# Patient Record
Sex: MALE | Race: WHITE | NOT HISPANIC OR LATINO | Employment: FULL TIME | URBAN - METROPOLITAN AREA
[De-identification: names, ages, dates, MRNs, and addresses within clinical notes are randomized per-mention and may not be internally consistent; named-entity substitution may affect disease eponyms.]

---

## 2023-03-31 ENCOUNTER — TELEPHONE (OUTPATIENT)
Dept: PRIMARY CARE | Facility: CLINIC | Age: 43
End: 2023-03-31
Payer: COMMERCIAL

## 2023-03-31 DIAGNOSIS — M10.9 ACUTE GOUT, UNSPECIFIED CAUSE, UNSPECIFIED SITE: Primary | ICD-10-CM

## 2023-03-31 DIAGNOSIS — M10.9 ACUTE GOUT OF FOOT, UNSPECIFIED CAUSE, UNSPECIFIED LATERALITY: Primary | ICD-10-CM

## 2023-03-31 PROBLEM — R42 LIGHTHEADEDNESS: Status: ACTIVE | Noted: 2023-03-31

## 2023-03-31 PROBLEM — M54.9 BACK PAIN: Status: ACTIVE | Noted: 2023-03-31

## 2023-03-31 PROBLEM — S93.401A SPRAIN OF RIGHT ANKLE, INITIAL ENCOUNTER: Status: ACTIVE | Noted: 2023-03-31

## 2023-03-31 PROBLEM — S99.911A RIGHT ANKLE INJURY, INITIAL ENCOUNTER: Status: ACTIVE | Noted: 2023-03-31

## 2023-03-31 PROBLEM — G89.29 CHRONIC PAIN IN TESTICLE: Status: ACTIVE | Noted: 2023-03-31

## 2023-03-31 PROBLEM — M76.61 ACHILLES TENDINITIS OF RIGHT LOWER EXTREMITY: Status: ACTIVE | Noted: 2023-03-31

## 2023-03-31 PROBLEM — J45.909 ASTHMA (HHS-HCC): Status: ACTIVE | Noted: 2023-03-31

## 2023-03-31 PROBLEM — N50.819 CHRONIC PAIN IN TESTICLE: Status: ACTIVE | Noted: 2023-03-31

## 2023-03-31 PROBLEM — M51.26 LUMBAR DISC HERNIATION: Status: ACTIVE | Noted: 2023-03-31

## 2023-03-31 PROBLEM — M26.629 TMJ SYNDROME: Status: ACTIVE | Noted: 2023-03-31

## 2023-03-31 PROBLEM — S33.5XXA LOW BACK SPRAIN, INITIAL ENCOUNTER: Status: ACTIVE | Noted: 2023-03-31

## 2023-03-31 RX ORDER — INDOMETHACIN 50 MG/1
50 CAPSULE ORAL
COMMUNITY
Start: 2017-05-12 | End: 2023-03-31 | Stop reason: SDUPTHER

## 2023-03-31 RX ORDER — GABAPENTIN 600 MG/1
600 TABLET ORAL 3 TIMES DAILY
COMMUNITY
End: 2024-03-04 | Stop reason: SDUPTHER

## 2023-03-31 RX ORDER — BACLOFEN 10 MG/1
10 TABLET ORAL AS NEEDED
COMMUNITY

## 2023-03-31 RX ORDER — INDOMETHACIN 50 MG/1
50 CAPSULE ORAL
Qty: 90 CAPSULE | Refills: 0 | Status: SHIPPED | OUTPATIENT
Start: 2023-03-31 | End: 2023-04-30

## 2023-03-31 RX ORDER — FLUTICASONE PROPIONATE AND SALMETEROL 250; 50 UG/1; UG/1
1 POWDER RESPIRATORY (INHALATION) 2 TIMES DAILY
COMMUNITY
End: 2023-06-13

## 2023-03-31 RX ORDER — INDOMETHACIN 50 MG/1
CAPSULE ORAL
Qty: 30 CAPSULE | Refills: 3 | Status: CANCELLED | OUTPATIENT
Start: 2023-03-31

## 2023-03-31 RX ORDER — ETODOLAC 500 MG/1
500 TABLET, FILM COATED ORAL AS NEEDED
COMMUNITY
Start: 2015-09-29 | End: 2023-06-06

## 2023-03-31 RX ORDER — ALBUTEROL SULFATE 90 UG/1
2 AEROSOL, METERED RESPIRATORY (INHALATION) EVERY 4 HOURS PRN
COMMUNITY
End: 2023-06-14 | Stop reason: SDUPTHER

## 2023-03-31 RX ORDER — NALOXONE HYDROCHLORIDE 4 MG/.1ML
4 SPRAY NASAL AS NEEDED
COMMUNITY
Start: 2022-01-12 | End: 2023-06-06

## 2023-06-02 ENCOUNTER — TELEPHONE (OUTPATIENT)
Dept: PRIMARY CARE | Facility: CLINIC | Age: 43
End: 2023-06-02
Payer: COMMERCIAL

## 2023-06-02 DIAGNOSIS — J45.909 ASTHMA, UNSPECIFIED ASTHMA SEVERITY, UNSPECIFIED WHETHER COMPLICATED, UNSPECIFIED WHETHER PERSISTENT (HHS-HCC): Primary | ICD-10-CM

## 2023-06-06 ENCOUNTER — LAB (OUTPATIENT)
Dept: LAB | Facility: LAB | Age: 43
End: 2023-06-06
Payer: COMMERCIAL

## 2023-06-06 ENCOUNTER — OFFICE VISIT (OUTPATIENT)
Dept: PRIMARY CARE | Facility: CLINIC | Age: 43
End: 2023-06-06
Payer: COMMERCIAL

## 2023-06-06 VITALS
HEIGHT: 75 IN | WEIGHT: 205.4 LBS | BODY MASS INDEX: 25.54 KG/M2 | OXYGEN SATURATION: 97 % | DIASTOLIC BLOOD PRESSURE: 74 MMHG | SYSTOLIC BLOOD PRESSURE: 140 MMHG | HEART RATE: 80 BPM

## 2023-06-06 DIAGNOSIS — R07.81 RIB PAIN ON RIGHT SIDE: Primary | ICD-10-CM

## 2023-06-06 DIAGNOSIS — R73.9 HYPERGLYCEMIA: ICD-10-CM

## 2023-06-06 DIAGNOSIS — Z00.00 ANNUAL PHYSICAL EXAM: ICD-10-CM

## 2023-06-06 DIAGNOSIS — R63.4 ABNORMAL WEIGHT LOSS: ICD-10-CM

## 2023-06-06 LAB
ALANINE AMINOTRANSFERASE (SGPT) (U/L) IN SER/PLAS: 19 U/L (ref 10–52)
ALBUMIN (G/DL) IN SER/PLAS: 4.5 G/DL (ref 3.4–5)
ALKALINE PHOSPHATASE (U/L) IN SER/PLAS: 64 U/L (ref 33–120)
ANION GAP IN SER/PLAS: 11 MMOL/L (ref 10–20)
ASPARTATE AMINOTRANSFERASE (SGOT) (U/L) IN SER/PLAS: 19 U/L (ref 9–39)
BILIRUBIN TOTAL (MG/DL) IN SER/PLAS: 0.5 MG/DL (ref 0–1.2)
CALCIUM (MG/DL) IN SER/PLAS: 9.9 MG/DL (ref 8.6–10.6)
CARBON DIOXIDE, TOTAL (MMOL/L) IN SER/PLAS: 29 MMOL/L (ref 21–32)
CHLORIDE (MMOL/L) IN SER/PLAS: 106 MMOL/L (ref 98–107)
CHOLESTEROL (MG/DL) IN SER/PLAS: 177 MG/DL (ref 0–199)
CHOLESTEROL IN HDL (MG/DL) IN SER/PLAS: 48.3 MG/DL
CHOLESTEROL/HDL RATIO: 3.7
CREATININE (MG/DL) IN SER/PLAS: 0.91 MG/DL (ref 0.5–1.3)
ERYTHROCYTE DISTRIBUTION WIDTH (RATIO) BY AUTOMATED COUNT: 14.3 % (ref 11.5–14.5)
ERYTHROCYTE MEAN CORPUSCULAR HEMOGLOBIN CONCENTRATION (G/DL) BY AUTOMATED: 33 G/DL (ref 32–36)
ERYTHROCYTE MEAN CORPUSCULAR VOLUME (FL) BY AUTOMATED COUNT: 90 FL (ref 80–100)
ERYTHROCYTES (10*6/UL) IN BLOOD BY AUTOMATED COUNT: 5.09 X10E12/L (ref 4.5–5.9)
ESTIMATED AVERAGE GLUCOSE FOR HBA1C: 123 MG/DL
GFR MALE: >90 ML/MIN/1.73M2
GLUCOSE (MG/DL) IN SER/PLAS: 107 MG/DL (ref 74–99)
HEMATOCRIT (%) IN BLOOD BY AUTOMATED COUNT: 45.8 % (ref 41–52)
HEMOGLOBIN (G/DL) IN BLOOD: 15.1 G/DL (ref 13.5–17.5)
HEMOGLOBIN A1C/HEMOGLOBIN TOTAL IN BLOOD: 5.9 %
LDL: 113 MG/DL (ref 0–99)
LEUKOCYTES (10*3/UL) IN BLOOD BY AUTOMATED COUNT: 8.3 X10E9/L (ref 4.4–11.3)
NRBC (PER 100 WBCS) BY AUTOMATED COUNT: 0 /100 WBC (ref 0–0)
PLATELETS (10*3/UL) IN BLOOD AUTOMATED COUNT: 233 X10E9/L (ref 150–450)
POC FINGERSTICK BLOOD GLUCOSE: 110 MG/DL (ref 70–100)
POTASSIUM (MMOL/L) IN SER/PLAS: 4.3 MMOL/L (ref 3.5–5.3)
PROTEIN TOTAL: 6.9 G/DL (ref 6.4–8.2)
SODIUM (MMOL/L) IN SER/PLAS: 142 MMOL/L (ref 136–145)
THYROXINE (T4) FREE (NG/DL) IN SER/PLAS: 1.07 NG/DL (ref 0.78–1.48)
TRIGLYCERIDE (MG/DL) IN SER/PLAS: 78 MG/DL (ref 0–149)
UREA NITROGEN (MG/DL) IN SER/PLAS: 15 MG/DL (ref 6–23)
VLDL: 16 MG/DL (ref 0–40)

## 2023-06-06 PROCEDURE — 85027 COMPLETE CBC AUTOMATED: CPT

## 2023-06-06 PROCEDURE — 80061 LIPID PANEL: CPT

## 2023-06-06 PROCEDURE — 36415 COLL VENOUS BLD VENIPUNCTURE: CPT

## 2023-06-06 PROCEDURE — 82962 GLUCOSE BLOOD TEST: CPT | Performed by: FAMILY MEDICINE

## 2023-06-06 PROCEDURE — 83036 HEMOGLOBIN GLYCOSYLATED A1C: CPT

## 2023-06-06 PROCEDURE — 80053 COMPREHEN METABOLIC PANEL: CPT

## 2023-06-06 PROCEDURE — 84439 ASSAY OF FREE THYROXINE: CPT

## 2023-06-06 PROCEDURE — 99214 OFFICE O/P EST MOD 30 MIN: CPT | Performed by: FAMILY MEDICINE

## 2023-06-06 ASSESSMENT — PAIN SCALES - GENERAL: PAINLEVEL: 4

## 2023-06-06 NOTE — PROGRESS NOTES
"Subjective   Patient ID: Uzair Vivas is a 42 y.o. male who presents for Pain (Right side pain underneath rib cage. ).    HPI End of April felt like stab pains on left axillary line. Prickly feeling. That faded away, then developed a pain on right lateral rib area.  Has lost 60lbs.  No appetite.  No taste or smell.  End of last week patient also felt short of breath.  This has resolved.  Overall he feels much better.  Smoking: less.  Review of Systems  Denies any changes to his bowel movements.  He has no blood in his stool.  Denies any abdominal pain.  Denies excessive thirst or urination  Objective   /74 (BP Location: Left arm, Patient Position: Sitting, BP Cuff Size: Adult)   Pulse 80   Ht 1.905 m (6' 3\")   Wt 93.2 kg (205 lb 6.4 oz)   SpO2 97%   BMI 25.67 kg/m²     Physical Exam  Alert, pleasant and in no acute distress.  Heart: Regular rate and rhythm without murmur  Lungs: Clear to auscultation  Lower extremities: No edema  Abdomen: Soft, nontender without palpable mass.  Rib cage: There is some mild discomfort with rib compression on the right side.  Assessment/Plan   Problem List Items Addressed This Visit    None  Visit Diagnoses       Rib pain on right side    -  Primary    Abnormal weight loss        Relevant Orders    POCT fingerstick glucose manually resulted (Completed)    T4, free    Hyperglycemia        Relevant Orders    Hemoglobin A1C    Annual physical exam        Relevant Orders    POCT fingerstick glucose manually resulted (Completed)    Comprehensive Metabolic Panel    CBC    Lipid Panel    T4, free        Patient here with rib pain.  Things seem to be improving.  We are going to monitor as examination is essentially normal other than some mild discomfort with compression.  He is to follow-up if there is any new shortness of breath, cough or increased pain.  Patient has had a 50+ pound weight loss over the last year or so.  He feels it was due to losing his sense of taste and smell.  " In office blood sugar 110.  We will get an A1c and full labs including T4 to look for any other underlying causes.  We will call patient in the next 2 to 5 days with results.  Encouraged healthy diet.  Discussed importance of quitting smoking.  If weight loss persists, I would like to see patient back in the next 3 months

## 2023-06-13 DIAGNOSIS — J45.909 UNSPECIFIED ASTHMA, UNCOMPLICATED (HHS-HCC): ICD-10-CM

## 2023-06-13 RX ORDER — FLUTICASONE PROPIONATE AND SALMETEROL 250; 50 UG/1; UG/1
POWDER RESPIRATORY (INHALATION)
Qty: 60 EACH | Refills: 5 | Status: SHIPPED | OUTPATIENT
Start: 2023-06-13 | End: 2023-11-21 | Stop reason: SDUPTHER

## 2023-06-13 NOTE — TELEPHONE ENCOUNTER
----- Message from Vasiliy Buckner DO sent at 6/11/2023  7:26 PM EDT -----  Please let patient know that his blood work came back stable.  Cholesterol is good at 177.  Kidney and liver tests look good.  There was no anemia.  The long-range sugar test came back very mildly elevated.  Recommend watching the sweets in the diet.  Finally, recommend follow-up in 3 months so that we can recheck how things are going with the weight.  ----- Message -----  From: Lab, Background User  Sent: 6/6/2023   3:25 PM EDT  To: Vasiliy Buckner DO

## 2023-06-14 RX ORDER — ALBUTEROL SULFATE 90 UG/1
2 AEROSOL, METERED RESPIRATORY (INHALATION) EVERY 4 HOURS PRN
Qty: 18 G | Refills: 3 | Status: SHIPPED | OUTPATIENT
Start: 2023-06-14

## 2023-11-21 ENCOUNTER — TELEPHONE (OUTPATIENT)
Dept: PRIMARY CARE | Facility: CLINIC | Age: 43
End: 2023-11-21
Payer: COMMERCIAL

## 2023-11-21 DIAGNOSIS — J45.909 UNSPECIFIED ASTHMA, UNCOMPLICATED (HHS-HCC): ICD-10-CM

## 2023-11-21 RX ORDER — FLUTICASONE PROPIONATE AND SALMETEROL 250; 50 UG/1; UG/1
1 POWDER RESPIRATORY (INHALATION) 2 TIMES DAILY
Qty: 60 EACH | Refills: 5 | Status: SHIPPED | OUTPATIENT
Start: 2023-11-21 | End: 2023-12-21

## 2023-11-21 NOTE — TELEPHONE ENCOUNTER
Uzair is requesting a refill for his Advair Diskus, its been awhile since he last saw you but he said its hard to get in due to his work schedule. Wants to know if you can still refill it for him? [facility]

## 2024-03-04 ENCOUNTER — OFFICE VISIT (OUTPATIENT)
Dept: PAIN MEDICINE | Facility: CLINIC | Age: 44
End: 2024-03-04
Payer: COMMERCIAL

## 2024-03-04 VITALS
SYSTOLIC BLOOD PRESSURE: 162 MMHG | BODY MASS INDEX: 25.49 KG/M2 | HEART RATE: 88 BPM | TEMPERATURE: 99 F | WEIGHT: 205 LBS | HEIGHT: 75 IN | DIASTOLIC BLOOD PRESSURE: 94 MMHG

## 2024-03-04 DIAGNOSIS — M51.26 LUMBAR DISC HERNIATION: Primary | ICD-10-CM

## 2024-03-04 DIAGNOSIS — S33.5XXA LOW BACK SPRAIN, INITIAL ENCOUNTER: ICD-10-CM

## 2024-03-04 DIAGNOSIS — Z79.891 LONG TERM PRESCRIPTION OPIATE USE: ICD-10-CM

## 2024-03-04 PROCEDURE — 99214 OFFICE O/P EST MOD 30 MIN: CPT | Performed by: ANESTHESIOLOGY

## 2024-03-04 RX ORDER — GABAPENTIN 600 MG/1
600 TABLET ORAL 3 TIMES DAILY
Qty: 270 TABLET | Refills: 3 | Status: SHIPPED | OUTPATIENT
Start: 2024-03-04 | End: 2024-06-02

## 2024-03-04 SDOH — ECONOMIC STABILITY: FOOD INSECURITY: WITHIN THE PAST 12 MONTHS, THE FOOD YOU BOUGHT JUST DIDN'T LAST AND YOU DIDN'T HAVE MONEY TO GET MORE.: NEVER TRUE

## 2024-03-04 SDOH — ECONOMIC STABILITY: FOOD INSECURITY: WITHIN THE PAST 12 MONTHS, YOU WORRIED THAT YOUR FOOD WOULD RUN OUT BEFORE YOU GOT MONEY TO BUY MORE.: NEVER TRUE

## 2024-03-04 ASSESSMENT — PATIENT HEALTH QUESTIONNAIRE - PHQ9
2. FEELING DOWN, DEPRESSED OR HOPELESS: SEVERAL DAYS
1. LITTLE INTEREST OR PLEASURE IN DOING THINGS: SEVERAL DAYS
10. IF YOU CHECKED OFF ANY PROBLEMS, HOW DIFFICULT HAVE THESE PROBLEMS MADE IT FOR YOU TO DO YOUR WORK, TAKE CARE OF THINGS AT HOME, OR GET ALONG WITH OTHER PEOPLE: SOMEWHAT DIFFICULT
SUM OF ALL RESPONSES TO PHQ9 QUESTIONS 1 AND 2: 2

## 2024-03-04 ASSESSMENT — ENCOUNTER SYMPTOMS
DEPRESSION: 0
LOSS OF SENSATION IN FEET: 1
OCCASIONAL FEELINGS OF UNSTEADINESS: 1

## 2024-03-04 ASSESSMENT — LIFESTYLE VARIABLES
HOW OFTEN DO YOU HAVE SIX OR MORE DRINKS ON ONE OCCASION: NEVER
HOW OFTEN DO YOU HAVE A DRINK CONTAINING ALCOHOL: NEVER
AUDIT-C TOTAL SCORE: 0
SKIP TO QUESTIONS 9-10: 1
HOW MANY STANDARD DRINKS CONTAINING ALCOHOL DO YOU HAVE ON A TYPICAL DAY: PATIENT DOES NOT DRINK

## 2024-03-04 ASSESSMENT — COLUMBIA-SUICIDE SEVERITY RATING SCALE - C-SSRS
2. HAVE YOU ACTUALLY HAD ANY THOUGHTS OF KILLING YOURSELF?: NO
6. HAVE YOU EVER DONE ANYTHING, STARTED TO DO ANYTHING, OR PREPARED TO DO ANYTHING TO END YOUR LIFE?: NO
1. IN THE PAST MONTH, HAVE YOU WISHED YOU WERE DEAD OR WISHED YOU COULD GO TO SLEEP AND NOT WAKE UP?: NO

## 2024-03-04 ASSESSMENT — PAIN SCALES - GENERAL: PAINLEVEL: 4

## 2024-03-04 NOTE — PROGRESS NOTES
History Of Present Illness  Uzair Vivas is a 43 y.o. male presenting with   Chief Complaint   Patient presents with    Back Pain     [facility]     Patient follows up after 1 year chronic low back pain that occasionally radiates to his B/L posterior LE with right foot drop. He reports pain is radiating into his tailbone and into his LEFT Testicle which is new. Denies any increased urinary frequency and denies any burning on urination.      Recall: He reports some symptoms of left TMJ have resolved since increasing his gabapentin from 300mg TID to 400mg BID. - As of 10-.      Recall: The pt is s/p lumbar surgery with Dr. Tony Samuel in 2000, and another surgery in September of 2010 at [facility].      The pain is constant, worse with activity and better with rest. The pain is an aching, stabbing and shooting to the B/L LE. Denies LE paresthesias, weakness, saddle anesthesia, bowel or bladder incontinence. To manage this pain the patient has attempted PT with no relief of his pain after 6 weeks of therapy. The patient has undergone lumbar injections and reports no relief of his pain and that he gained weight and developed stretch marks. The patients chronic Asthma on an inhaler.      Pt takes Baclofen for leg cramps, NSAIDS for chronic aching back pain. Pt was taking Percocet 7.5/325mg 35 tabs every month.      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 Pickard     PCP: Dr. Arias          Past Medical History  He has a past medical history of Hordeolum externum unspecified eye, unspecified eyelid (02/10/2020), Personal history of other diseases of the nervous system and sense organs (02/22/2020), Personal history of other diseases of the respiratory system (08/15/2016), Personal history of other diseases of the respiratory system (12/09/2020), and Puncture wound without foreign body, right foot, initial encounter  (11/10/2016).    Surgical History  He has a past surgical history that includes Other surgical history (12/09/2020).     Social History  He reports that he has been smoking cigarettes. He has never used smokeless tobacco. He reports that he does not drink alcohol and does not use drugs.    Family History  No family history on file.     Allergies  Patient has no known allergies.    Review of Systems    All other systems reviewed and negative for any deficits. Pertinent positives and negatives were considered in the medical decision making process.        Physical Exam  BP (!) 162/94   Pulse 88   Temp 37.2 °C (99 °F)   Wt 93 kg (205 lb)     General: Pt appears stated age     Eyes: Conjunctiva non-icteric and lids without obvious rash or drooping. Pupils are symmetric     ENT: Hearing is grossly intact     Neck: No JVD noted, tracheal position midline.      Skin: No rashes or open lesions/ulcers identified on skin.     ---Healed incision with keloid formation on his lumbar spine measuring 5 inches in length.      Musculoskeletal: Gait is grossly normal     Digits/nails show no clubbing or cyanosis     Exam of muscles/joints/bones shows no gross atrophy and no abnormal/involuntary movements in the head/neckNo asymmetry or masses noted in the head/neck     Stability: no subluxation noted on movement of bilateral upper extremities or head/neck     Strength: 4/5 in B/L lower extremities      ----Right Foot drop      Range of Motion: WNL      Sensation: In tact      Cranial nerves 2-12 are grossly intact     Psychiatric: Pt is alert and oriented to time, place and person.         Assessment/Plan   1. Lumbar disc herniation        2. Low back sprain, initial encounter        3. Long term prescription opiate use             Diagnoses/Problems   · Lumbar disc herniation (722.10) (M51.26)   · Long term prescription opiate use (V58.69) (Z79.891)   · Low back sprain, initial encounter (846.9) (S33.5XXA)     Orders  Long term  prescription opiate use    · Renew: Gabapentin 600 MG Oral Tablet; TAKE 1 TABLET 3 TIMES DAILY     Provider Impressions     [facility] Dx:   M51.26  S33.5XXA        1. I have previously provided the patient with a list of physical therapy exercises to learn and perform.     Recall he sprained his ankle in Sept of 2021 and has resumed his usual activities for several months.      2. I would recommend the pt CONTINUE on Gabapentin 400mg BID to help with nerve related pain. We discussed the risks, benefits, and side effects to this medication including the mechanism of action and the pt understands and agrees.     3. We once again did discuss the risks, benefits, and alternatives to chronic opioid therapy and that usage can be a danger to life. We also discussed proper and safe storage of medication to prevent unauthorized usage. The patient understands and will use the medicine responsibly.     Recall: I previously informed the our office does not prescribe Oxycodone 7.5/325mg.      Pt did admit to using THC in the past.      I advised pt that if anything illegal or sedating is found in his urine our office would discharge him from out practice.      I will continue on Percocet 5/325mg 30 tabs per month and his initial UDS was positive for THC. His start date will be 1-.      I previously extensively reviewed the 7 page opiate contract with the pt and he understands every point in there and signed his initial contract on 12-9-2020.      ORT was completed on 1-  Narcan was prescribed 1-  CSA was completed on 1- and it as reviewed line by line.   UDS was to be collected on 1-     *Pt reports he would rather continue on THC instead of Percocet and has been weaned off of Percocet.      4. The patient is a candidate for an CAUDAL to treat back and radicular pain. I spent time with the patient discussing all of the risks, benefits, and alternatives to this measure. Including but not limited to  spinal infection, epidural hematoma/abscess, paralysis, nerve injury, steroid effects, and spinal headache. The patient understands but wants to hold off for now.      5. Pt does newly report testicle pain. He is going to see Dr. Sebastian who is his family urologist. Denies any fevers / chills/sweats or change in urinary frequency, i have also given him a referral to urology.     F/U 6 - 12 month      I spent time with the patient reviewing their imaging and discussing the risks benefits and alternatives to the above plan. A total of 30 minutes was spent reviewing the data and greater than 50% of that time was with the patient during the face to face encounter discussing treatment options both surgical, non-surgical, and minimally invasive techniques.       Aniket Paige MD

## 2024-03-11 ENCOUNTER — TELEPHONE (OUTPATIENT)
Dept: PRIMARY CARE | Facility: CLINIC | Age: 44
End: 2024-03-11
Payer: COMMERCIAL

## 2024-03-11 DIAGNOSIS — M10.9 ACUTE GOUT, UNSPECIFIED CAUSE, UNSPECIFIED SITE: Primary | ICD-10-CM

## 2024-03-11 RX ORDER — INDOMETHACIN 25 MG/1
25 CAPSULE ORAL 3 TIMES DAILY PRN
Qty: 30 CAPSULE | Refills: 0 | Status: SHIPPED | OUTPATIENT
Start: 2024-03-11 | End: 2025-03-11

## 2024-03-11 NOTE — TELEPHONE ENCOUNTER
Uzair states he is having a Gout flare up and wants to know if you can send in the Indomethacin 50 mg for him?    [facility] on file

## 2024-06-12 ENCOUNTER — TELEPHONE (OUTPATIENT)
Dept: PRIMARY CARE | Facility: CLINIC | Age: 44
End: 2024-06-12
Payer: COMMERCIAL

## 2024-06-12 DIAGNOSIS — J45.909 UNSPECIFIED ASTHMA, UNCOMPLICATED (HHS-HCC): ICD-10-CM

## 2024-06-12 RX ORDER — FLUTICASONE PROPIONATE AND SALMETEROL 250; 50 UG/1; UG/1
1 POWDER RESPIRATORY (INHALATION) 2 TIMES DAILY
Qty: 60 EACH | Refills: 11 | Status: SHIPPED | OUTPATIENT
Start: 2024-06-12 | End: 2025-06-12

## 2024-06-12 NOTE — TELEPHONE ENCOUNTER
Pt here last June & needed refill on fluticasone 250-50 mcg diskus inhaler  To drug [phone]  No allergies  Ty

## 2024-11-08 ENCOUNTER — APPOINTMENT (OUTPATIENT)
Dept: PAIN MEDICINE | Facility: CLINIC | Age: 44
End: 2024-11-08
Payer: COMMERCIAL

## 2024-11-08 VITALS
RESPIRATION RATE: 15 BRPM | OXYGEN SATURATION: 99 % | WEIGHT: 205 LBS | HEIGHT: 75 IN | DIASTOLIC BLOOD PRESSURE: 106 MMHG | TEMPERATURE: 97.9 F | BODY MASS INDEX: 25.49 KG/M2 | HEART RATE: 68 BPM | SYSTOLIC BLOOD PRESSURE: 150 MMHG

## 2024-11-08 DIAGNOSIS — S33.5XXA LOW BACK SPRAIN, INITIAL ENCOUNTER: ICD-10-CM

## 2024-11-08 DIAGNOSIS — Z79.891 LONG TERM PRESCRIPTION OPIATE USE: ICD-10-CM

## 2024-11-08 DIAGNOSIS — M51.26 LUMBAR DISC HERNIATION: Primary | ICD-10-CM

## 2024-11-08 PROCEDURE — 99214 OFFICE O/P EST MOD 30 MIN: CPT | Performed by: ANESTHESIOLOGY

## 2024-11-08 RX ORDER — GABAPENTIN 600 MG/1
600 TABLET ORAL 3 TIMES DAILY
Qty: 270 TABLET | Refills: 3 | Status: SHIPPED | OUTPATIENT
Start: 2024-11-08 | End: 2025-02-06

## 2024-11-08 ASSESSMENT — PAIN DESCRIPTION - DESCRIPTORS: DESCRIPTORS: ACHING;DULL;SHOOTING

## 2024-11-08 ASSESSMENT — COLUMBIA-SUICIDE SEVERITY RATING SCALE - C-SSRS
1. IN THE PAST MONTH, HAVE YOU WISHED YOU WERE DEAD OR WISHED YOU COULD GO TO SLEEP AND NOT WAKE UP?: NO
2. HAVE YOU ACTUALLY HAD ANY THOUGHTS OF KILLING YOURSELF?: NO
6. HAVE YOU EVER DONE ANYTHING, STARTED TO DO ANYTHING, OR PREPARED TO DO ANYTHING TO END YOUR LIFE?: NO

## 2024-11-08 ASSESSMENT — PAIN SCALES - GENERAL
PAINLEVEL_OUTOF10: 5 - MODERATE PAIN
PAINLEVEL_OUTOF10: 5

## 2024-11-08 ASSESSMENT — PAIN - FUNCTIONAL ASSESSMENT: PAIN_FUNCTIONAL_ASSESSMENT: 0-10

## 2024-11-08 ASSESSMENT — ENCOUNTER SYMPTOMS
OCCASIONAL FEELINGS OF UNSTEADINESS: 1
LOSS OF SENSATION IN FEET: 0

## 2024-11-08 ASSESSMENT — PATIENT HEALTH QUESTIONNAIRE - PHQ9
2. FEELING DOWN, DEPRESSED OR HOPELESS: NOT AT ALL
1. LITTLE INTEREST OR PLEASURE IN DOING THINGS: NOT AT ALL
SUM OF ALL RESPONSES TO PHQ9 QUESTIONS 1 AND 2: 0

## 2024-11-08 NOTE — PROGRESS NOTES
History Of Present Illness  Uzair Vivas is a 44 y.o. male presenting with   Chief Complaint   Patient presents with    Follow-up     [facility]     Patient follows up after 1 year chronic low back pain that occasionally radiates to his B/L posterior LE with right foot drop. He reports pain is radiating into his tailbone and into his LEFT Testicle which is new. Denies any increased urinary frequency and denies any burning on urination.      Recall: He reports some symptoms of left TMJ have resolved since increasing his gabapentin from 300mg TID to 400mg BID. - As of 10-.      Recall: The pt is s/p lumbar surgery with Dr. Tony Samuel in 2000, and another surgery in September of 2010 at [facility].      The pain is constant, worse with activity and better with rest. The pain is an aching, stabbing and shooting to the B/L LE. Denies LE paresthesias, weakness, saddle anesthesia, bowel or bladder incontinence. To manage this pain the patient has attempted PT with no relief of his pain after 6 weeks of therapy. The patient has undergone lumbar injections and reports no relief of his pain and that he gained weight and developed stretch marks. The patients chronic Asthma on an inhaler.      Pt takes Baclofen for leg cramps, NSAIDS for chronic aching back pain. Pt was taking Percocet 7.5/325mg 35 tabs every month.      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 Pickard     PCP: Dr. Arias        Past Medical History  He has a past medical history of Hordeolum externum unspecified eye, unspecified eyelid (02/10/2020), Personal history of other diseases of the nervous system and sense organs (02/22/2020), Personal history of other diseases of the respiratory system (08/15/2016), Personal history of other diseases of the respiratory system (12/09/2020), and Puncture wound without foreign body, right foot, initial encounter  (11/10/2016).    Surgical History  He has a past surgical history that includes Other surgical history (12/09/2020).     Social History  He reports that he has been smoking cigarettes. He has never used smokeless tobacco. He reports that he does not drink alcohol and does not use drugs.    Family History  No family history on file.     Allergies  Patient has no known allergies.    Review of Systems    All other systems reviewed and negative for any deficits. Pertinent positives and negatives were considered in the medical decision making process.        Physical Exam  BP (!) 150/106   Pulse 68   Temp 36.6 °C (97.9 °F)   Resp 15   Wt 93 kg (205 lb)   SpO2 99%     General: Pt appears stated age     Eyes: Conjunctiva non-icteric and lids without obvious rash or drooping. Pupils are symmetric     ENT: Hearing is grossly intact     Neck: No JVD noted, tracheal position midline.      Skin: No rashes or open lesions/ulcers identified on skin.     ---Healed incision with keloid formation on his lumbar spine measuring 5 inches in length.      Musculoskeletal: Gait is grossly normal     Digits/nails show no clubbing or cyanosis     Exam of muscles/joints/bones shows no gross atrophy and no abnormal/involuntary movements in the head/neckNo asymmetry or masses noted in the head/neck     Stability: no subluxation noted on movement of bilateral upper extremities or head/neck     Strength: 4/5 in B/L lower extremities      ----Right Foot drop      Range of Motion: WNL      Sensation: In tact      Cranial nerves 2-12 are grossly intact     Psychiatric: Pt is alert and oriented to time, place and person.         Assessment/Plan   1. Lumbar disc herniation        2. Long term prescription opiate use        3. Low back sprain, initial encounter               Diagnoses/Problems   · Lumbar disc herniation (722.10) (M51.26)   · Long term prescription opiate use (V58.69) (Z79.891)   · Low back sprain, initial encounter (846.9)  (S33.5XXA)     Orders  Long term prescription opiate use    · Renew: Gabapentin 600 MG Oral Tablet; TAKE 1 TABLET 3 TIMES DAILY     Provider Impressions     [facility] Dx:   M51.26  S33.5XXA        1. I have previously provided the patient with a list of physical therapy exercises to learn and perform.     Recall he sprained his ankle in Sept of 2021 and has resumed his usual activities for several months.      2. I would recommend the pt CONTINUE on Gabapentin 400mg BID to help with nerve related pain. We discussed the risks, benefits, and side effects to this medication including the mechanism of action and the pt understands and agrees.     3. We once again did discuss the risks, benefits, and alternatives to chronic opioid therapy and that usage can be a danger to life. We also discussed proper and safe storage of medication to prevent unauthorized usage. The patient understands and will use the medicine responsibly.     Recall: I previously informed the our office does not prescribe Oxycodone 7.5/325mg.      Pt did admit to using THC in the past.      I advised pt that if anything illegal or sedating is found in his urine our office would discharge him from out practice.      I will continue on Percocet 5/325mg 30 tabs per month and his initial UDS was positive for THC. His start date will be 1-.      I previously extensively reviewed the 7 page opiate contract with the pt and he understands every point in there and signed his initial contract on 12-9-2020.      ORT was completed on 1-  Narcan was prescribed 1-  CSA was completed on 1- and it as reviewed line by line.   UDS was to be collected on 1-     *Pt reports he would rather continue on THC instead of Percocet and has been weaned off of Percocet.      4. The patient is a candidate for an CAUDAL to treat back and radicular pain. I spent time with the patient discussing all of the risks, benefits, and alternatives to this  measure. Including but not limited to spinal infection, epidural hematoma/abscess, paralysis, nerve injury, steroid effects, and spinal headache. The patient understands but wants to hold off for now.      5. Pt does newly report testicle pain. He is going to see Dr. Sebastian who is his family urologist. Denies any fevers / chills/sweats or change in urinary frequency, i have also given him a referral to urology.     F/U 6 - 12 month      I spent time with the patient reviewing their imaging and discussing the risks benefits and alternatives to the above plan. A total of 30 minutes was spent reviewing the data and greater than 50% of that time was with the patient during the face to face encounter discussing treatment options both surgical, non-surgical, and minimally invasive techniques.       Aniket Paige MD

## 2025-02-19 DIAGNOSIS — M51.26 LUMBAR DISC HERNIATION: ICD-10-CM

## 2025-02-20 RX ORDER — GABAPENTIN 600 MG/1
600 TABLET ORAL 3 TIMES DAILY
Qty: 270 TABLET | Refills: 3 | Status: SHIPPED | OUTPATIENT
Start: 2025-02-20 | End: 2025-05-21

## 2025-06-10 ENCOUNTER — TELEPHONE (OUTPATIENT)
Dept: PRIMARY CARE | Facility: CLINIC | Age: 45
End: 2025-06-10
Payer: COMMERCIAL

## 2025-06-10 DIAGNOSIS — J45.909 ASTHMA, UNSPECIFIED ASTHMA SEVERITY, UNSPECIFIED WHETHER COMPLICATED, UNSPECIFIED WHETHER PERSISTENT (HHS-HCC): ICD-10-CM

## 2025-06-10 RX ORDER — ALBUTEROL SULFATE 90 UG/1
2 INHALANT RESPIRATORY (INHALATION) EVERY 4 HOURS PRN
Qty: 6.7 G | Refills: 0 | Status: SHIPPED | OUTPATIENT
Start: 2025-06-10

## 2025-06-10 NOTE — TELEPHONE ENCOUNTER
Rx Refill Request Telephone Encounter    Name:  Uzair Vivas  :  [id number]  Medication Name:  albuterol 90 mcg     Specific Pharmacy location:    [facility] [phone]      Date of last appointment:  2023  Date of next appointment:  n/a  Best number to reach patient:  [phone]

## 2025-06-25 ENCOUNTER — APPOINTMENT (OUTPATIENT)
Dept: PRIMARY CARE | Facility: CLINIC | Age: 45
End: 2025-06-25
Payer: COMMERCIAL

## 2025-06-25 VITALS
OXYGEN SATURATION: 97 % | HEART RATE: 68 BPM | DIASTOLIC BLOOD PRESSURE: 101 MMHG | TEMPERATURE: 96.9 F | HEIGHT: 75 IN | SYSTOLIC BLOOD PRESSURE: 153 MMHG | WEIGHT: 206 LBS | BODY MASS INDEX: 25.61 KG/M2

## 2025-06-25 DIAGNOSIS — J45.909 ASTHMA, UNSPECIFIED ASTHMA SEVERITY, UNSPECIFIED WHETHER COMPLICATED, UNSPECIFIED WHETHER PERSISTENT (HHS-HCC): ICD-10-CM

## 2025-06-25 DIAGNOSIS — Z12.5 ENCOUNTER FOR SCREENING PROSTATE SPECIFIC ANTIGEN (PSA) MEASUREMENT: ICD-10-CM

## 2025-06-25 DIAGNOSIS — J45.909 UNSPECIFIED ASTHMA, UNCOMPLICATED (HHS-HCC): ICD-10-CM

## 2025-06-25 DIAGNOSIS — Z13.220 SCREENING CHOLESTEROL LEVEL: ICD-10-CM

## 2025-06-25 DIAGNOSIS — Z13.0 SCREENING FOR DEFICIENCY ANEMIA: ICD-10-CM

## 2025-06-25 DIAGNOSIS — Z13.29 THYROID DISORDER SCREEN: ICD-10-CM

## 2025-06-25 DIAGNOSIS — Z13.1 DIABETES MELLITUS SCREENING: ICD-10-CM

## 2025-06-25 DIAGNOSIS — Z00.00 WELLNESS EXAMINATION: Primary | ICD-10-CM

## 2025-06-25 DIAGNOSIS — I10 PRIMARY HYPERTENSION: ICD-10-CM

## 2025-06-25 DIAGNOSIS — Z13.21 ENCOUNTER FOR VITAMIN DEFICIENCY SCREENING: ICD-10-CM

## 2025-06-25 PROCEDURE — 3077F SYST BP >= 140 MM HG: CPT | Performed by: NURSE PRACTITIONER

## 2025-06-25 PROCEDURE — 3080F DIAST BP >= 90 MM HG: CPT | Performed by: NURSE PRACTITIONER

## 2025-06-25 PROCEDURE — 3008F BODY MASS INDEX DOCD: CPT | Performed by: NURSE PRACTITIONER

## 2025-06-25 PROCEDURE — 99396 PREV VISIT EST AGE 40-64: CPT | Performed by: NURSE PRACTITIONER

## 2025-06-25 RX ORDER — FLUTICASONE PROPIONATE AND SALMETEROL 250; 50 UG/1; UG/1
1 POWDER RESPIRATORY (INHALATION) 2 TIMES DAILY
Qty: 60 EACH | Refills: 11 | Status: SHIPPED | OUTPATIENT
Start: 2025-06-25 | End: 2026-06-25

## 2025-06-25 RX ORDER — HYDROCHLOROTHIAZIDE 25 MG/1
25 TABLET ORAL DAILY
Qty: 90 TABLET | Refills: 3 | Status: SHIPPED | OUTPATIENT
Start: 2025-06-25 | End: 2026-06-20

## 2025-06-25 ASSESSMENT — ENCOUNTER SYMPTOMS
MUSCULOSKELETAL NEGATIVE: 1
PSYCHIATRIC NEGATIVE: 1
ENDOCRINE NEGATIVE: 1
CARDIOVASCULAR NEGATIVE: 1
EYES NEGATIVE: 1
NEUROLOGICAL NEGATIVE: 1
HEMATOLOGIC/LYMPHATIC NEGATIVE: 1
DEPRESSION: 0
ALLERGIC/IMMUNOLOGIC NEGATIVE: 1
CONSTITUTIONAL NEGATIVE: 1
RESPIRATORY NEGATIVE: 1
GASTROINTESTINAL NEGATIVE: 1

## 2025-06-25 NOTE — PATIENT INSTRUCTIONS
Good Seeing you today.  Continue medications as prescribed.  Healthy diet and Drink plenty of water-UNLESS RESTRICTIONS.  Regular exercises.  Labs drawn today in clinic.  Please see [facility] for results.  Will notify you with abnormal results only by telephone or [facility].  If you have any question about your labs do not hesitate to give our office a call.    If you do not have access to [employer] our office will notify you with any abnormal results.  Please schedule for ANY REFERRALS SUBMITTED. If you have problems scheduling please notify our office.    Please schedule any appointments with ophthalmology/dentist if you are not up-to-date.    Follow-up in 2 months for Blood pressure.    Call office any new concerns.;;    Your blood pressure was elevated today in clinic and her been started on hydrochlorothiazide which is a blood pressure medication.  Will also advise you to check your blood pressures at home.   Please read printed information given about hypertension and DASH diet.

## 2025-06-25 NOTE — PROGRESS NOTES
"Subjective   Patient ID: Uzair Vivas is a 44 y.o. male who presents for Establish Care (Medication refill/Patient is fasting. ).    HPI     Previous visit Dr. Arias patient presents to clinic to establish care.    Patient without any specific complaints or concerns today.    Patient with past medical history of asthma    Past surgical history laminectomy 2000, 2010    Blood pressure elevated today in clinic at 153/101 patient states his blood pressures been elevated for many years but was never started on any medication.  Patient denies headaches dizziness chest pains feeling short of breath.      Patient being followed by pain management Dr. Paige managing chronic low back pain.      Current everyday cigarette smoker 1 pack/day x 25 years patient declines smoking cessation at this time.    Rare alcohol use.    Appetite normal bowel and bladder normal.    Preventive screening/immunizations  Care Gaps Reviewed addressed  Ophthalmology and dental visits up-to-date.   Calcium scoring test ordered today  Immunizations reviewed and up-to-date.        Review of Systems   Constitutional: Negative.    HENT: Negative.     Eyes: Negative.    Respiratory: Negative.     Cardiovascular: Negative.    Gastrointestinal: Negative.    Endocrine: Negative.    Genitourinary: Negative.    Musculoskeletal: Negative.    Skin: Negative.    Allergic/Immunologic: Negative.    Neurological: Negative.    Hematological: Negative.    Psychiatric/Behavioral: Negative.         Objective   BP (!) 153/101 (BP Location: Right arm, Patient Position: Sitting, BP Cuff Size: Adult)   Pulse 68   Temp 36.1 °C (96.9 °F) (Temporal)   Ht 1.905 m (6' 3\")   Wt 93.4 kg (206 lb)   SpO2 97%   BMI 25.75 kg/m²     Physical Exam  Vitals reviewed.   Constitutional:       General: He is not in acute distress.     Appearance: Normal appearance. He is not ill-appearing.   HENT:      Right Ear: Tympanic membrane and ear canal normal.      Left Ear: Tympanic " membrane and ear canal normal.      Mouth/Throat:      Mouth: Mucous membranes are moist.      Pharynx: Oropharynx is clear.   Eyes:      Conjunctiva/sclera: Conjunctivae normal.      Pupils: Pupils are equal, round, and reactive to light.   Cardiovascular:      Rate and Rhythm: Normal rate and regular rhythm.   Pulmonary:      Effort: Pulmonary effort is normal.      Breath sounds: Normal breath sounds.   Abdominal:      General: Bowel sounds are normal.      Palpations: Abdomen is soft.   Musculoskeletal:         General: Normal range of motion.      Cervical back: Normal range of motion and neck supple.   Skin:     General: Skin is warm and dry.   Neurological:      General: No focal deficit present.      Mental Status: He is alert and oriented to person, place, and time.   Psychiatric:         Mood and Affect: Mood normal.         Thought Content: Thought content normal.         Assessment/Plan   Diagnoses and all orders for this visit:  Wellness examination  - Unremarkable physical exam  -Labs today in clinic  - Calcium score ordered today in clinic  - Follow-up for yearly exam.  Primary hypertension  -     hydroCHLOROthiazide (HYDRODiuril) 25 mg tablet; Take 1 tablet (25 mg) by mouth once daily.  New manage started today  -     CT cardiac scoring wo IV contrast; Future  -DASH diet  -Home blood pressure checks  -Follow-up 2 months  Asthma, unspecified asthma severity, unspecified whether complicated, unspecified whether persistent ([facility]-H[facility])  -Advair diskus 250-50 1 puff twice daily  -Albuterol inhaler 2 puffs every 4 hours as needed   Diabetes mellitus screening  -     Comprehensive Metabolic Panel  -     Hemoglobin A1C  Encounter for screening prostate specific antigen (PSA) measurement  -     Prostate Specific Antigen, Screen  Thyroid disorder screen  -     TSH with reflex to Free T4 if abnormal  Screening for deficiency anemia  -     CBC and Auto Differential  Screening cholesterol level  -     Lipid  Panel  Encounter for vitamin deficiency screening  -     Vitamin D 25-Hydroxy,Total (for eval of Vitamin D levels)  Unspecified asthma, uncomplicated ([facility])  -     fluticasone propion-salmeteroL (Advair Diskus) 250-50 mcg/dose diskus inhaler; Inhale 1 puff 2 times a day. Rinse mouth with water after use to reduce aftertaste and incidence of candidiasis. Do not swallow.

## 2025-06-26 LAB
25(OH)D3+25(OH)D2 SERPL-MCNC: 37 NG/ML (ref 30–100)
ALBUMIN SERPL-MCNC: 4.4 G/DL (ref 3.6–5.1)
ALP SERPL-CCNC: 58 U/L (ref 36–130)
ALT SERPL-CCNC: 18 U/L (ref 9–46)
ANION GAP SERPL CALCULATED.4IONS-SCNC: 8 MMOL/L (CALC) (ref 7–17)
AST SERPL-CCNC: 19 U/L (ref 10–40)
BASOPHILS # BLD AUTO: 83 CELLS/UL (ref 0–200)
BASOPHILS NFR BLD AUTO: 0.9 %
BILIRUB SERPL-MCNC: 0.4 MG/DL (ref 0.2–1.2)
BUN SERPL-MCNC: 19 MG/DL (ref 7–25)
CALCIUM SERPL-MCNC: 9.1 MG/DL (ref 8.6–10.3)
CHLORIDE SERPL-SCNC: 108 MMOL/L (ref 98–110)
CHOLEST SERPL-MCNC: 157 MG/DL
CHOLEST/HDLC SERPL: 3 (CALC)
CO2 SERPL-SCNC: 27 MMOL/L (ref 20–32)
CREAT SERPL-MCNC: 0.89 MG/DL (ref 0.6–1.29)
EGFRCR SERPLBLD CKD-EPI 2021: 108 ML/MIN/1.73M2
EOSINOPHIL # BLD AUTO: 304 CELLS/UL (ref 15–500)
EOSINOPHIL NFR BLD AUTO: 3.3 %
ERYTHROCYTE [DISTWIDTH] IN BLOOD BY AUTOMATED COUNT: 13.9 % (ref 11–15)
EST. AVERAGE GLUCOSE BLD GHB EST-MCNC: 123 MG/DL
EST. AVERAGE GLUCOSE BLD GHB EST-SCNC: 6.8 MMOL/L
GLUCOSE SERPL-MCNC: 100 MG/DL (ref 65–99)
HBA1C MFR BLD: 5.9 %
HCT VFR BLD AUTO: 46.3 % (ref 38.5–50)
HDLC SERPL-MCNC: 53 MG/DL
HGB BLD-MCNC: 14.7 G/DL (ref 13.2–17.1)
LDLC SERPL CALC-MCNC: 92 MG/DL (CALC)
LYMPHOCYTES # BLD AUTO: 2815 CELLS/UL (ref 850–3900)
LYMPHOCYTES NFR BLD AUTO: 30.6 %
MCH RBC QN AUTO: 30.8 PG (ref 27–33)
MCHC RBC AUTO-ENTMCNC: 31.7 G/DL (ref 32–36)
MCV RBC AUTO: 96.9 FL (ref 80–100)
MONOCYTES # BLD AUTO: 791 CELLS/UL (ref 200–950)
MONOCYTES NFR BLD AUTO: 8.6 %
NEUTROPHILS # BLD AUTO: 5207 CELLS/UL (ref 1500–7800)
NEUTROPHILS NFR BLD AUTO: 56.6 %
NONHDLC SERPL-MCNC: 104 MG/DL (CALC)
PLATELET # BLD AUTO: 237 THOUSAND/UL (ref 140–400)
PMV BLD REES-ECKER: 11.2 FL (ref 7.5–12.5)
POTASSIUM SERPL-SCNC: 4.4 MMOL/L (ref 3.5–5.3)
PROT SERPL-MCNC: 6.3 G/DL (ref 6.1–8.1)
PSA SERPL-MCNC: 0.29 NG/ML
RBC # BLD AUTO: 4.78 MILLION/UL (ref 4.2–5.8)
SODIUM SERPL-SCNC: 143 MMOL/L (ref 135–146)
TRIGL SERPL-MCNC: 40 MG/DL
TSH SERPL-ACNC: 1.91 MIU/L (ref 0.4–4.5)
WBC # BLD AUTO: 9.2 THOUSAND/UL (ref 3.8–10.8)

## 2025-07-02 ENCOUNTER — HOSPITAL ENCOUNTER (OUTPATIENT)
Dept: RADIOLOGY | Facility: HOSPITAL | Age: 45
Discharge: HOME | End: 2025-07-02
Payer: COMMERCIAL

## 2025-07-02 DIAGNOSIS — I10 PRIMARY HYPERTENSION: ICD-10-CM

## 2025-07-02 PROCEDURE — 75571 CT HRT W/O DYE W/CA TEST: CPT

## 2025-07-03 DIAGNOSIS — R91.1 PULMONARY NODULE, LEFT: Primary | ICD-10-CM

## 2025-07-20 ENCOUNTER — HOSPITAL ENCOUNTER (OUTPATIENT)
Dept: RADIOLOGY | Facility: HOSPITAL | Age: 45
Discharge: HOME | End: 2025-07-20
Payer: COMMERCIAL

## 2025-07-20 DIAGNOSIS — R91.1 PULMONARY NODULE, LEFT: ICD-10-CM

## 2025-07-20 PROCEDURE — 71250 CT THORAX DX C-: CPT

## 2025-07-20 PROCEDURE — 71250 CT THORAX DX C-: CPT | Performed by: RADIOLOGY

## 2025-08-13 ENCOUNTER — APPOINTMENT (OUTPATIENT)
Dept: PRIMARY CARE | Facility: CLINIC | Age: 45
End: 2025-08-13
Payer: COMMERCIAL

## 2025-11-10 ENCOUNTER — APPOINTMENT (OUTPATIENT)
Dept: PAIN MEDICINE | Facility: CLINIC | Age: 45
End: 2025-11-10
Payer: COMMERCIAL